# Patient Record
Sex: MALE | HISPANIC OR LATINO | URBAN - METROPOLITAN AREA
[De-identification: names, ages, dates, MRNs, and addresses within clinical notes are randomized per-mention and may not be internally consistent; named-entity substitution may affect disease eponyms.]

---

## 2017-02-22 ENCOUNTER — OFFICE (OUTPATIENT)
Dept: URBAN - METROPOLITAN AREA CLINIC 95 | Facility: CLINIC | Age: 42
End: 2017-02-22

## 2017-02-22 VITALS
SYSTOLIC BLOOD PRESSURE: 140 MMHG | HEART RATE: 64 BPM | DIASTOLIC BLOOD PRESSURE: 88 MMHG | TEMPERATURE: 97.5 F | WEIGHT: 196 LBS | HEIGHT: 67 IN

## 2017-02-22 DIAGNOSIS — K62.5 HEMORRHAGE OF ANUS AND RECTUM: ICD-10-CM

## 2017-02-22 DIAGNOSIS — F10.20 ALCOHOL DEPENDENCE, UNCOMPLICATED: ICD-10-CM

## 2017-02-22 PROCEDURE — 99204 OFFICE O/P NEW MOD 45 MIN: CPT

## 2017-02-22 NOTE — SERVICEHPINOTES
Mr. Prieto is here to discuss rectal bleeding. He has been ignoring it for about 1-2 years per patient. He notes that he is an alcoholic but has been trying to stop drinking in the past 5-7 months. Blood can be in the stool or on the tissue--red blood. No bleeding outside of bowel movements. Since cutting down on ETOH, he has seen less blood per rectum. He notes external hemorrhoids. No abdominal pain. He has lost weight but is trying to loose it and starting exercising. Has lost 15 lbs in 3 months. No regular NSAID use. He doesn't smoke. No family hx of colon cancer or polyps.

## 2017-03-03 ENCOUNTER — OFFICE (OUTPATIENT)
Dept: URBAN - METROPOLITAN AREA CLINIC 32 | Facility: CLINIC | Age: 42
End: 2017-03-03

## 2017-03-03 ENCOUNTER — INDEPENDENT LABORATORY (OUTPATIENT)
Dept: URBAN - METROPOLITAN AREA PATHOLOGY 17 | Facility: PATHOLOGY | Age: 42
End: 2017-03-03

## 2017-03-03 VITALS
TEMPERATURE: 98.1 F | HEART RATE: 58 BPM | RESPIRATION RATE: 14 BRPM | OXYGEN SATURATION: 100 % | SYSTOLIC BLOOD PRESSURE: 137 MMHG | OXYGEN SATURATION: 97 % | SYSTOLIC BLOOD PRESSURE: 103 MMHG | HEIGHT: 67 IN | HEART RATE: 48 BPM | DIASTOLIC BLOOD PRESSURE: 60 MMHG | WEIGHT: 196 LBS | RESPIRATION RATE: 16 BRPM | DIASTOLIC BLOOD PRESSURE: 88 MMHG | TEMPERATURE: 97.9 F

## 2017-03-03 DIAGNOSIS — K62.5 HEMORRHAGE OF ANUS AND RECTUM: ICD-10-CM

## 2017-03-03 DIAGNOSIS — K62.1 RECTAL POLYP: ICD-10-CM

## 2017-03-03 PROBLEM — K64.4 RESIDUAL HEMORRHOIDAL SKIN TAGS: Status: ACTIVE | Noted: 2017-03-03

## 2017-03-03 PROBLEM — D12.0 BENIGN NEOPLASM OF CECUM: Status: ACTIVE | Noted: 2017-03-03

## 2017-03-03 PROBLEM — K64.8 OTHER HEMORRHOIDS: Status: ACTIVE | Noted: 2017-03-03

## 2017-03-03 PROCEDURE — 00810: CPT | Mod: QS,AA

## 2017-03-03 PROCEDURE — 00810: CPT | Mod: AA,QS

## 2017-03-03 PROCEDURE — 88305 TISSUE EXAM BY PATHOLOGIST: CPT

## 2017-09-13 ENCOUNTER — OFFICE (OUTPATIENT)
Dept: URBAN - METROPOLITAN AREA CLINIC 78 | Facility: CLINIC | Age: 42
End: 2017-09-13

## 2017-09-13 PROCEDURE — 00014: CPT
